# Patient Record
Sex: MALE | Race: WHITE | NOT HISPANIC OR LATINO | Employment: FULL TIME | URBAN - METROPOLITAN AREA
[De-identification: names, ages, dates, MRNs, and addresses within clinical notes are randomized per-mention and may not be internally consistent; named-entity substitution may affect disease eponyms.]

---

## 2019-12-06 ENCOUNTER — OFFICE VISIT (OUTPATIENT)
Dept: FAMILY MEDICINE CLINIC | Facility: CLINIC | Age: 44
End: 2019-12-06

## 2019-12-06 VITALS
HEIGHT: 73 IN | HEART RATE: 106 BPM | WEIGHT: 219 LBS | BODY MASS INDEX: 29.03 KG/M2 | SYSTOLIC BLOOD PRESSURE: 120 MMHG | OXYGEN SATURATION: 96 % | DIASTOLIC BLOOD PRESSURE: 80 MMHG

## 2019-12-06 DIAGNOSIS — Z23 NEEDS FLU SHOT: ICD-10-CM

## 2019-12-06 DIAGNOSIS — Z00.00 PREVENTATIVE HEALTH CARE: ICD-10-CM

## 2019-12-06 DIAGNOSIS — H02.60: Primary | ICD-10-CM

## 2019-12-06 DIAGNOSIS — M72.2 PLANTAR FASCIITIS, BILATERAL: ICD-10-CM

## 2019-12-06 DIAGNOSIS — R07.9 CHEST PAIN, EXERTIONAL: ICD-10-CM

## 2019-12-06 PROCEDURE — 90471 IMMUNIZATION ADMIN: CPT | Performed by: FAMILY MEDICINE

## 2019-12-06 PROCEDURE — 90686 IIV4 VACC NO PRSV 0.5 ML IM: CPT | Performed by: FAMILY MEDICINE

## 2019-12-06 PROCEDURE — 99204 OFFICE O/P NEW MOD 45 MIN: CPT | Performed by: FAMILY MEDICINE

## 2019-12-06 NOTE — PATIENT INSTRUCTIONS
1. Use ice and roller ice bottle.  2. Rest, avoid movements that increase pain, use an ACE wrap for compression and elevate the affected area as much as possible to reduce swelling.   3. Take 600-800 mg of Ibuprofen to reduce inflammation and pain as needed every 8 hours for up to one week.     1.  Continue medications and supplements - as listed.    2.  Continue well-balanced diet - low in calories and low in added sugar.  -Plant-based diets have the best evidence for decreasing inflammation and chronic pain, as well as reducing the risk of heart disease and dementia.    3.  Maintain a routine exercise program - 30 minutes at least 3 days every week.  This is important even if you are active at your job.    4.  You have been referred for specialized imaging and/or specialist office consultation today.  You will be contacted by our referral coordinator or the specialist's office to schedule your appointment over the next 2 to 3 business days.  Please make sure to check your voicemail, and ensure that we have the correct phone number on file for you.  Sometimes, based on insurance requirements, referrals may take longer to schedule. However if you have not heard from anyone within 7 days, please call the office to check on your referral status.    .

## 2019-12-06 NOTE — ADDENDUM NOTE
Addended by: PATRICIA JOHNS on: 12/6/2019 03:20 PM     Modules accepted: Orders, Level of Service

## 2019-12-06 NOTE — PROGRESS NOTES
Subjective   Zenon Emanuel is a 44 y.o. male.     Chief Complaint   Patient presents with   • Establish Care     hypertension, uc follow up        History of Present Illness     Previous primary care: >12 years    Chronic health conditions:  None that he knows    Acute complaints:  Zenon Emanuel is a 44 y.o. male who presents today to establish care.     Cholesterol deposits above both eyelids, started on left with one and has progressed to current bilateral. 2.5 years. Has noticed pain and tightening in bilateral forearms past couple of weeks.    Foot pain. Seemed to start after going down hardwood stairs on his heels, wore a boot when he was at [employer], now past 4 months he has severe pain in both heels, radiates up to his calves on both sides. Has tried orthotics with no improvement.    This patient is accompanied by their self who contributes to the history of their care.    The following portions of the patient's history were reviewed and updated as appropriate: allergies, current medications, past family history, past medical history, past social history, past surgical history and problem list.    Active Ambulatory Problems     Diagnosis Date Noted   • Plantar fasciitis, bilateral 12/06/2019     Resolved Ambulatory Problems     Diagnosis Date Noted   • No Resolved Ambulatory Problems     No Additional Past Medical History     Past Surgical History:   Procedure Laterality Date   • KNEE SURGERY       Family History   Problem Relation Age of Onset   • Diabetes Mother    • Heart attack Mother    • Hypertension Mother    • Hyperlipidemia Mother    • Stroke Brother      Social History     Socioeconomic History   • Marital status: Single     Spouse name: Not on file   • Number of children: Not on file   • Years of education: Not on file   • Highest education level: Not on file   Tobacco Use   • Smoking status: Current Every Day Smoker     Packs/day: 0.50     Types: Cigarettes   • Smokeless tobacco: Never Used  "  Substance and Sexual Activity   • Alcohol use: Yes     Comment: socially    • Drug use: Yes     Types: Marijuana   • Sexual activity: Defer       Review of Systems  General ROS: negative for - chills, fever or night sweats  Cardiovascular ROS: no chest pain or dyspnea on exertion  Gastrointestinal ROS: no abdominal pain, change in bowel habits, or black or bloody stools  Genito-Urinary ROS: no dysuria, trouble voiding, or hematuria    Objective   Blood pressure 120/80, pulse 106, height 185.4 cm (72.99\"), weight 99.3 kg (219 lb), SpO2 96 %.  Nursing note reviewed  Physical Exam  Const: NAD, A&Ox4, Pleasant, Cooperative  Eyes: EOMI, no conjunctivitis  ENT: No nasal discharge present, neck supple  Cardiac: Regular rate and rhythm, no cyanosis  Resp: Respiratory rate within normal limits, no increased work of breathing, no audible wheezing or retractions noted  GI: No distention or ascites  MSK: Motor and sensation grossly intact in bilateral upper extremities  Neurologic: CN II-XII grossly intact  Psych: Appropriate mood and behavior.  Skin: Warm, dry  Procedures  Assessment/Plan   Problem List Items Addressed This Visit        Musculoskeletal and Integument    Plantar fasciitis, bilateral      Other Visit Diagnoses     Xanthoma planum of eyelid, bilateral    -  Primary    Relevant Orders    Lipid Panel    Stress Test With Myocardial Perfusion One Day    Preventative health care        Relevant Orders    Lipid Panel    Comprehensive Metabolic Panel    CBC & Differential    TSH    Hemoglobin A1c    Urinalysis With Microscopic If Indicated (No Culture) - Urine, Clean Catch    Uric Acid    Chest pain, exertional        Relevant Orders    Stress Test With Myocardial Perfusion One Day        I have spent 45 minutes face-to-face with 25 minutes spent counseling the patient on the diagnoses, possible treatment options and outcomes, ajunctive and alternative treatments, and developing a care plan.    We will plan to obtain " previous records both for chronic preventative care as well as those related to the current episode of care.  Any records that the patient brought with him today were reviewed personally by me during the visit today and will be scanned into the chart for posterity.    Patient Instructions   1. Use ice and roller ice bottle.  2. Rest, avoid movements that increase pain, use an ACE wrap for compression and elevate the affected area as much as possible to reduce swelling.   3. Take 600-800 mg of Ibuprofen to reduce inflammation and pain as needed every 8 hours for up to one week.     1.  Continue medications and supplements - as listed.    2.  Continue well-balanced diet - low in calories and low in added sugar.  -Plant-based diets have the best evidence for decreasing inflammation and chronic pain, as well as reducing the risk of heart disease and dementia.    3.  Maintain a routine exercise program - 30 minutes at least 3 days every week.  This is important even if you are active at your job.    4.  You have been referred for specialized imaging and/or specialist office consultation today.  You will be contacted by our referral coordinator or the specialist's office to schedule your appointment over the next 2 to 3 business days.  Please make sure to check your voicemail, and ensure that we have the correct phone number on file for you.  Sometimes, based on insurance requirements, referrals may take longer to schedule. However if you have not heard from anyone within 7 days, please call the office to check on your referral status.    .      Return in about 2 weeks (around 12/23/2019).    Ambulatory progress note signed and attested to by Levi Arreola D.O.

## 2019-12-09 ENCOUNTER — LAB (OUTPATIENT)
Dept: LAB | Facility: HOSPITAL | Age: 44
End: 2019-12-09

## 2019-12-09 DIAGNOSIS — Z00.00 PREVENTATIVE HEALTH CARE: ICD-10-CM

## 2019-12-09 DIAGNOSIS — H02.60: ICD-10-CM

## 2019-12-09 LAB
ALBUMIN SERPL-MCNC: 4.5 G/DL (ref 3.5–5.2)
ALBUMIN/GLOB SERPL: 1.1 G/DL
ALP SERPL-CCNC: 84 U/L (ref 39–117)
ALT SERPL W P-5'-P-CCNC: 35 U/L (ref 1–41)
ANION GAP SERPL CALCULATED.3IONS-SCNC: 14.2 MMOL/L (ref 5–15)
AST SERPL-CCNC: 19 U/L (ref 1–40)
BASOPHILS # BLD AUTO: 0.09 10*3/MM3 (ref 0–0.2)
BASOPHILS NFR BLD AUTO: 1 % (ref 0–1.5)
BILIRUB SERPL-MCNC: 0.2 MG/DL (ref 0.2–1.2)
BILIRUB UR QL STRIP: NEGATIVE
BUN BLD-MCNC: 10 MG/DL (ref 6–20)
BUN/CREAT SERPL: 9.3 (ref 7–25)
CALCIUM SPEC-SCNC: 9.7 MG/DL (ref 8.6–10.5)
CHLORIDE SERPL-SCNC: 99 MMOL/L (ref 98–107)
CHOLEST SERPL-MCNC: 277 MG/DL (ref 0–200)
CLARITY UR: CLEAR
CO2 SERPL-SCNC: 24.8 MMOL/L (ref 22–29)
COLOR UR: YELLOW
CREAT BLD-MCNC: 1.08 MG/DL (ref 0.76–1.27)
DEPRECATED RDW RBC AUTO: 39.2 FL (ref 37–54)
EOSINOPHIL # BLD AUTO: 0.46 10*3/MM3 (ref 0–0.4)
EOSINOPHIL NFR BLD AUTO: 5.2 % (ref 0.3–6.2)
ERYTHROCYTE [DISTWIDTH] IN BLOOD BY AUTOMATED COUNT: 12.4 % (ref 12.3–15.4)
GFR SERPL CREATININE-BSD FRML MDRD: 74 ML/MIN/1.73
GLOBULIN UR ELPH-MCNC: 4.1 GM/DL
GLUCOSE BLD-MCNC: 103 MG/DL (ref 65–99)
GLUCOSE UR STRIP-MCNC: NEGATIVE MG/DL
HBA1C MFR BLD: 6.3 % (ref 4.8–5.6)
HCT VFR BLD AUTO: 48.3 % (ref 37.5–51)
HDLC SERPL-MCNC: 37 MG/DL (ref 40–60)
HGB BLD-MCNC: 17 G/DL (ref 13–17.7)
HGB UR QL STRIP.AUTO: NEGATIVE
IMM GRANULOCYTES # BLD AUTO: 0.04 10*3/MM3 (ref 0–0.05)
IMM GRANULOCYTES NFR BLD AUTO: 0.5 % (ref 0–0.5)
KETONES UR QL STRIP: NEGATIVE
LDLC SERPL CALC-MCNC: 203 MG/DL (ref 0–100)
LDLC/HDLC SERPL: 5.49 {RATIO}
LEUKOCYTE ESTERASE UR QL STRIP.AUTO: NEGATIVE
LYMPHOCYTES # BLD AUTO: 2.35 10*3/MM3 (ref 0.7–3.1)
LYMPHOCYTES NFR BLD AUTO: 26.6 % (ref 19.6–45.3)
MCH RBC QN AUTO: 31 PG (ref 26.6–33)
MCHC RBC AUTO-ENTMCNC: 35.2 G/DL (ref 31.5–35.7)
MCV RBC AUTO: 88.1 FL (ref 79–97)
MONOCYTES # BLD AUTO: 0.69 10*3/MM3 (ref 0.1–0.9)
MONOCYTES NFR BLD AUTO: 7.8 % (ref 5–12)
NEUTROPHILS # BLD AUTO: 5.19 10*3/MM3 (ref 1.7–7)
NEUTROPHILS NFR BLD AUTO: 58.9 % (ref 42.7–76)
NITRITE UR QL STRIP: NEGATIVE
NRBC BLD AUTO-RTO: 0.1 /100 WBC (ref 0–0.2)
PH UR STRIP.AUTO: 5.5 [PH] (ref 5–8)
PLATELET # BLD AUTO: 267 10*3/MM3 (ref 140–450)
PMV BLD AUTO: 12.1 FL (ref 6–12)
POTASSIUM BLD-SCNC: 4.4 MMOL/L (ref 3.5–5.2)
PROT SERPL-MCNC: 8.6 G/DL (ref 6–8.5)
PROT UR QL STRIP: NEGATIVE
RBC # BLD AUTO: 5.48 10*6/MM3 (ref 4.14–5.8)
SODIUM BLD-SCNC: 138 MMOL/L (ref 136–145)
SP GR UR STRIP: 1.02 (ref 1–1.03)
TRIGL SERPL-MCNC: 184 MG/DL (ref 0–150)
TSH SERPL DL<=0.05 MIU/L-ACNC: 1.51 UIU/ML (ref 0.27–4.2)
URATE SERPL-MCNC: 6.5 MG/DL (ref 3.4–7)
UROBILINOGEN UR QL STRIP: NORMAL
VLDLC SERPL-MCNC: 36.8 MG/DL (ref 5–40)
WBC NRBC COR # BLD: 8.82 10*3/MM3 (ref 3.4–10.8)

## 2019-12-09 PROCEDURE — 85025 COMPLETE CBC W/AUTO DIFF WBC: CPT

## 2019-12-09 PROCEDURE — 80053 COMPREHEN METABOLIC PANEL: CPT

## 2019-12-09 PROCEDURE — 80061 LIPID PANEL: CPT

## 2019-12-09 PROCEDURE — 83036 HEMOGLOBIN GLYCOSYLATED A1C: CPT

## 2019-12-09 PROCEDURE — 84550 ASSAY OF BLOOD/URIC ACID: CPT

## 2019-12-09 PROCEDURE — 84443 ASSAY THYROID STIM HORMONE: CPT

## 2019-12-09 PROCEDURE — 81003 URINALYSIS AUTO W/O SCOPE: CPT

## 2019-12-23 ENCOUNTER — OFFICE VISIT (OUTPATIENT)
Dept: FAMILY MEDICINE CLINIC | Facility: CLINIC | Age: 44
End: 2019-12-23

## 2019-12-23 VITALS
HEIGHT: 73 IN | OXYGEN SATURATION: 98 % | WEIGHT: 215 LBS | SYSTOLIC BLOOD PRESSURE: 110 MMHG | BODY MASS INDEX: 28.49 KG/M2 | HEART RATE: 92 BPM | DIASTOLIC BLOOD PRESSURE: 70 MMHG

## 2019-12-23 DIAGNOSIS — R73.03 PREDIABETES: ICD-10-CM

## 2019-12-23 DIAGNOSIS — E78.2 MIXED HYPERLIPIDEMIA: Primary | ICD-10-CM

## 2019-12-23 PROCEDURE — 99214 OFFICE O/P EST MOD 30 MIN: CPT | Performed by: FAMILY MEDICINE

## 2019-12-23 RX ORDER — ROSUVASTATIN CALCIUM 10 MG/1
10 TABLET, COATED ORAL DAILY
Qty: 30 TABLET | Refills: 5 | Status: SHIPPED | OUTPATIENT
Start: 2019-12-23 | End: 2020-07-21 | Stop reason: SDUPTHER

## 2019-12-23 NOTE — PROGRESS NOTES
Subjective   Zenon Emanuel is a 44 y.o. male.     Chief Complaint   Patient presents with   • Follow-up     labs       History of Present Illness     Acute complaints:  Zenon Emanuel is a 44 y.o. male who presents today for two-week follow-up on labs.  He has xanthelasma of the bilateral upper eyelids and it was presumed that he had fairly severe hyperlipidemia.  He established care here 2 weeks ago.  He has had labs done and was able to see them online.  His plantar fasciitis continues, he has started to do the stretching and icing program, but it has not been able to make much of a difference yet.     This patient is accompanied by their self who contributes to the history of their care.    The following portions of the patient's history were reviewed and updated as appropriate: allergies, current medications, past family history, past medical history, past social history, past surgical history and problem list.    Active Ambulatory Problems     Diagnosis Date Noted   • Plantar fasciitis, bilateral 12/06/2019   • Mixed hyperlipidemia 12/24/2019   • Prediabetes 12/24/2019     Resolved Ambulatory Problems     Diagnosis Date Noted   • No Resolved Ambulatory Problems     No Additional Past Medical History     Past Surgical History:   Procedure Laterality Date   • KNEE SURGERY       Family History   Problem Relation Age of Onset   • Diabetes Mother    • Heart attack Mother    • Hypertension Mother    • Hyperlipidemia Mother    • Stroke Brother      Social History     Socioeconomic History   • Marital status: Single     Spouse name: Not on file   • Number of children: Not on file   • Years of education: Not on file   • Highest education level: Not on file   Tobacco Use   • Smoking status: Current Every Day Smoker     Packs/day: 0.50     Types: Cigarettes   • Smokeless tobacco: Never Used   Substance and Sexual Activity   • Alcohol use: Yes     Comment: socially    • Drug use: Yes     Types: Marijuana   • Sexual  "activity: Defer       Review of Systems  General ROS: negative for - chills, fever or night sweats  Cardiovascular ROS: no chest pain or dyspnea on exertion  Gastrointestinal ROS: no abdominal pain, change in bowel habits, or black or bloody stools  Genito-Urinary ROS: no dysuria, trouble voiding, or hematuria    Objective   Blood pressure 110/70, pulse 92, height 185.4 cm (72.99\"), weight 97.5 kg (215 lb), SpO2 98 %.  Nursing note reviewed  Physical Exam  Const: NAD, A&Ox4, Pleasant, Cooperative  Eyes: EOMI, no conjunctivitis  ENT: No nasal discharge present, neck supple  Cardiac: Regular rate and rhythm, no cyanosis  Skin: Xanthelasma bilateral upper eyelids  Procedures  Assessment/Plan   Problem List Items Addressed This Visit        Cardiovascular and Mediastinum    Mixed hyperlipidemia - Primary    Overview     Lab Results   Component Value Date    CHOL 277 (H) 12/09/2019    TRIG 184 (H) 12/09/2019    HDL 37 (L) 12/09/2019     (H) 12/09/2019   Not actually as bad as I was expecting, his overall cardiac risk is low but I would recommend an LDL goal less than 160.  We will start him on rosuvastatin 10 mg daily, particularly with his cutaneous manifestations of hyperlipidemia.  -Recheck in 3 months         Relevant Medications    rosuvastatin (CRESTOR) 10 MG tablet    Other Relevant Orders    Comprehensive Metabolic Panel    Lipid Panel       Other    Prediabetes    Overview     Lab Results   Component Value Date    HGBA1C 6.30 (H) 12/09/2019   He also had a mild fasting blood sugar elevation to 103.  After discussion with the patient, he generally has good health otherwise and does not have a great deal of modifiable risk or behaviors that may improve his A1c through lifestyle changes.  We did discuss these lifestyle changes and I did recommend them, however I think starting metformin early for him would be beneficial.  -Start metformin 500 mg twice daily with meals, recheck A1c and fasting blood sugar in " 3 months         Relevant Medications    metFORMIN (GLUCOPHAGE) 500 MG tablet    Other Relevant Orders    Hemoglobin A1c          Patient Instructions   1.  Start medications as indicated    2.  Have stress test completed as scheduled    3.  Follow up in 3 months with repeat labs 1 week prior      Return in about 3 months (around 3/23/2020) for (fasting blood work 1 week prior to appt).    Ambulatory progress note signed and attested to by Levi Arreola D.O.

## 2019-12-23 NOTE — PATIENT INSTRUCTIONS
1.  Start medications as indicated    2.  Have stress test completed as scheduled    3.  Follow up in 3 months with repeat labs 1 week prior

## 2019-12-24 PROBLEM — E78.2 MIXED HYPERLIPIDEMIA: Status: ACTIVE | Noted: 2019-12-24

## 2019-12-24 PROBLEM — R73.03 PREDIABETES: Status: ACTIVE | Noted: 2019-12-24

## 2020-07-21 ENCOUNTER — OFFICE VISIT (OUTPATIENT)
Dept: FAMILY MEDICINE CLINIC | Facility: CLINIC | Age: 45
End: 2020-07-21

## 2020-07-21 DIAGNOSIS — E78.2 MIXED HYPERLIPIDEMIA: ICD-10-CM

## 2020-07-21 DIAGNOSIS — M54.9 UPPER BACK PAIN ON LEFT SIDE: Primary | ICD-10-CM

## 2020-07-21 DIAGNOSIS — R73.03 PREDIABETES: ICD-10-CM

## 2020-07-21 PROCEDURE — 99442 PR PHYS/QHP TELEPHONE EVALUATION 11-20 MIN: CPT | Performed by: FAMILY MEDICINE

## 2020-07-21 RX ORDER — MELOXICAM 15 MG/1
15 TABLET ORAL DAILY
Qty: 21 TABLET | Refills: 0 | Status: SHIPPED | OUTPATIENT
Start: 2020-07-21 | End: 2020-08-11

## 2020-07-21 RX ORDER — METAXALONE 800 MG/1
800 TABLET ORAL 3 TIMES DAILY PRN
Qty: 42 TABLET | Refills: 0 | Status: SHIPPED | OUTPATIENT
Start: 2020-07-21 | End: 2020-08-04

## 2020-07-21 RX ORDER — ROSUVASTATIN CALCIUM 10 MG/1
10 TABLET, COATED ORAL DAILY
Qty: 30 TABLET | Refills: 5 | Status: SHIPPED | OUTPATIENT
Start: 2020-07-21

## 2020-07-21 NOTE — PROGRESS NOTES
You have chosen to receive care through a telephone visit. Do you consent to use a telephone visit for your medical care today?  YES

## 2020-07-21 NOTE — PROGRESS NOTES
Subjective   Zenon Emanuel is a 44 y.o. male.     Chief Complaint   Patient presents with   • Back Pain       History of Present Illness     Zenon Emanuel presents today for   Chief Complaint   Patient presents with   • Back Pain     Friday pain between shoulder blades, had pain when moving but overall was ok. Now has progressed to left side of back.  His pain currently rates about a 5/10, sharp.  It is mechanical.  He does have some pain with deep inspiration.  He denies any worsening of cough, no fever.  He does endorse a low-grade headache.    You have chosen to receive care through a telephone visit. Do you consent to use a telephone visit for your medical care today? Yes     The following portions of the patient's history were reviewed and updated as appropriate: allergies, current medications, past family history, past medical history, past social history, past surgical history and problem list.    Active Ambulatory Problems     Diagnosis Date Noted   • Plantar fasciitis, bilateral 12/06/2019   • Mixed hyperlipidemia 12/24/2019   • Prediabetes 12/24/2019     Resolved Ambulatory Problems     Diagnosis Date Noted   • No Resolved Ambulatory Problems     No Additional Past Medical History     Past Surgical History:   Procedure Laterality Date   • KNEE SURGERY       Family History   Problem Relation Age of Onset   • Diabetes Mother    • Heart attack Mother    • Hypertension Mother    • Hyperlipidemia Mother    • Stroke Brother      Social History     Socioeconomic History   • Marital status: Single     Spouse name: Not on file   • Number of children: Not on file   • Years of education: Not on file   • Highest education level: Not on file   Tobacco Use   • Smoking status: Current Every Day Smoker     Packs/day: 0.50     Types: Cigarettes   • Smokeless tobacco: Never Used   Substance and Sexual Activity   • Alcohol use: Yes     Comment: socially    • Drug use: Yes     Types: Marijuana   • Sexual activity: Defer        Review of Systems  Review of Systems -  General ROS: negative for - chills, fever or night sweats  Cardiovascular ROS: Positive for left upper back pain radiating through to chest  Gastrointestinal ROS: no abdominal pain, change in bowel habits, or black or bloody stools  Genito-Urinary ROS: no dysuria, trouble voiding, or hematuria    Objective   There were no vitals taken for this visit.  Vitals obtained from patient if available  Physical Exam  Const: No indications of acute distress  Cardiac: Regular rate by pulse  Resp: Respiratory rate observed to be within normal limits, no increased work of breathing observed, no audible wheezing or cough noted  Psych: Appropriate mood and behavior. Speech is normal.  Procedures  Assessment/Plan   Problem List Items Addressed This Visit        Cardiovascular and Mediastinum    Mixed hyperlipidemia    Overview     Lab Results   Component Value Date    CHOL 277 (H) 12/09/2019    TRIG 184 (H) 12/09/2019    HDL 37 (L) 12/09/2019     (H) 12/09/2019   Not actually as bad as I was expecting, his overall cardiac risk is low but I would recommend an LDL goal less than 160.  We will start him on rosuvastatin 10 mg daily, particularly with his cutaneous manifestations of hyperlipidemia.  -Recheck in 3 months         Relevant Medications    rosuvastatin (Crestor) 10 MG tablet       Endocrine    Prediabetes    Overview     Lab Results   Component Value Date    HGBA1C 6.30 (H) 12/09/2019   He also had a mild fasting blood sugar elevation to 103.  After discussion with the patient, he generally has good health otherwise and does not have a great deal of modifiable risk or behaviors that may improve his A1c through lifestyle changes.  We did discuss these lifestyle changes and I did recommend them, however I think starting metformin early for him would be beneficial.  -Start metformin 500 mg twice daily with meals, recheck A1c and fasting blood sugar in 3 months          Relevant Medications    metFORMIN (Glucophage) 500 MG tablet      Other Visit Diagnoses     Upper back pain on left side    -  Primary    Relevant Medications    meloxicam (MOBIC) 15 MG tablet    metaxalone (Skelaxin) 800 MG tablet    Other Relevant Orders    COVID-19,[facility],NP/OP Swab in [employer] or ESwab 72 HR TAT - Swab, Oropharynx        His pain sounds mostly musculoskeletal, although he does have a chronic cough that appears unchanged.  No fever.  He does have a low-grade headache to go along with his dyspnea and pleuritic chest pain, so I would recommend a COVID test.  -Symptomatically though in the meantime I would recommend anti-inflammatory muscle relaxer, stretching exercises    See patient diagnoses and orders along with patient instructions for assessment, plan, and changes to care for patient.    This visit was conducted via telemedicine with live audio provided by telephone at the point of care. Phone Visit Duration: 15 minutes was spent on the phone with patient coordinating care, discussing his case and current plan, and engaging in shared medical decision making.    There are no Patient Instructions on file for this visit.    No follow-ups on file.    Ambulatory progress note signed and attested to by Levi Arreola D.O.

## 2020-07-22 PROCEDURE — U0003 INFECTIOUS AGENT DETECTION BY NUCLEIC ACID (DNA OR RNA); SEVERE ACUTE RESPIRATORY SYNDROME CORONAVIRUS 2 (SARS-COV-2) (CORONAVIRUS DISEASE [COVID-19]), AMPLIFIED PROBE TECHNIQUE, MAKING USE OF HIGH THROUGHPUT TECHNOLOGIES AS DESCRIBED BY CMS-2020-01-R: HCPCS | Performed by: FAMILY MEDICINE

## 2020-07-27 ENCOUNTER — TELEPHONE (OUTPATIENT)
Dept: FAMILY MEDICINE CLINIC | Facility: CLINIC | Age: 45
End: 2020-07-27

## 2020-07-27 NOTE — TELEPHONE ENCOUNTER
Caller: Zenon Emanuel    Relationship: Self    Best call back number: [phone]     Caller requesting test results: PT    What test was performed: COVID    When was the test performed: 07/22/20    [facility] the [facility]    Additional notes: PT CALLED FOR RESULTS, SECOND REQUEST. CONCERNED DUE TO 18 EMPLOYEES BEING HELD OFF WORK PENDING RESULTS.

## 2020-07-27 NOTE — TELEPHONE ENCOUNTER
Pt was advised via [employer] that labs are pending - they will release to [employer] immediately upon return.